# Patient Record
Sex: FEMALE | Race: OTHER | Employment: FULL TIME | URBAN - METROPOLITAN AREA
[De-identification: names, ages, dates, MRNs, and addresses within clinical notes are randomized per-mention and may not be internally consistent; named-entity substitution may affect disease eponyms.]

---

## 2017-04-10 PROBLEM — R00.2 INTERMITTENT PALPITATIONS: Status: ACTIVE | Noted: 2017-04-10

## 2017-09-29 ENCOUNTER — HOSPITAL ENCOUNTER (OUTPATIENT)
Dept: PHYSICAL THERAPY | Age: 27
Discharge: HOME | End: 2017-09-29
Payer: COMMERCIAL

## 2017-09-29 PROCEDURE — 97161 PT EVAL LOW COMPLEX 20 MIN: CPT

## 2017-09-29 NOTE — THERAPY EVALUATION
Abhishek Palacio  : 1990  Payor: Laly Martinez / Plan: [address] / Product Type: PPO /  [address]  at [facility]  [address] , [address], [place] [address]  [phone]   [phone]         OUTPATIENT PHYSICAL THERAPY:Initial Assessment 2017    ICD-10: Treatment Diagnosis: PATELLOFEMORAL DISORDERS M22.2X1;M22.2X2  Precautions/Allergies: non reported  Fall Risk Score: 0 (? 5 = High Risk)  MD Orders: evaluate and treat MEDICAL/REFERRING DIAGNOSIS:Bilateral Knee Pain  DATE OF ONSET: Oct 2016  [facility] PHYSICIAN:Lisha Pena MD  RETURN PHYSICIAN APPOINTMENT: did not specify     INITIAL ASSESSMENT:  Ms. Daquan Padilla presents to physical therapy with symptoms of bilateral knee pain . The examination reveals moderate medial joint line tenderness with (+) mcmurrey sign on the left side . The examination is of low complexity due to a stable clinical presentation. Skilled physical therapy is recommended to progress the plan of care in order for the patient to return to full function with minimal symptoms. PROBLEM LIST (Impacting functional limitations):  1. Decreased Strength  2. Decreased ADL/Functional Activities  3. Increased Pain  4. Decreased Activity Tolerance  5. Decreased Flexibility/Joint Mobility  6. Decreased [place] with Home Exercise Program INTERVENTIONS PLANNED:  1. Cold  2. Heat  3. Home Exercise Program (HEP)  4. Manual Therapy  5. Range of Motion (ROM)  6. Therapeutic Exercise/Strengthening     TREATMENT PLAN:  Effective Dates: 17 TO 17. Frequency/Duration: 1 time a week for 4 weeks  GOALS: (Goals have been discussed and agreed upon with patient.)  SHORT-TERM FUNCTIONAL GOALS: Time Frame: 2-4 wks  1. Patient will report 25-50% reduction in overall symptoms  2. Patient will be compliant with HEP and plan of care  3.   Patient will purchase the correct type of footwear as judged by therapist  DISCHARGE GOALS: Time Frame: 6-8 wk  1.  Patient will report % reduction in overall symptoms in order to be able to have full function with decreased symptoms  2. Patient will be able to run 3 miles with minimal symptoms (0-2/10)  3. Patient will improve on the LEFS by 4-5 points in order to demonstrate improved function with less pain    Rehabilitation Potential For Stated Goals: Good    Regarding [place] therapy, I certify that the treatment plan above will be carried out by a therapist or under their direction. Thank you for this referral,  Martín Valdez PT,DPT    Referring Physician Signature: Olga Pack MD _____________________________________________________ Date: __________________________________          HISTORY:   History of Present Injury/Illness (Reason for Referral):  Reports that she started having knee pain while running a long relay race (3,6, and 7 mile. She stopped running after that but continued to have pain. She played soccer but that bothered her knees as well. She signed up again for the relay race in October. Pain is in the medial knee. No previous trauma to the knees.   Aggravating factors: kneeling   Relieving factors:  KT tape  Past Medical History/Comorbidities: IBS  Social History/Living Environment: lives in an apartment (1 flight of steps to get to)  Prior Level of Function/Work/Activity: [employer]  Current Medications:  Intergam   Date Last Reviewed:  10/1/2017   Number of Personal Factors/Comorbidities that affect the Plan of Care: 0: LOW COMPLEXITY   EXAMINATION:   (Abbreviations: P-pain, NP- no pain, wnl-within normal limits)  Observation/Orthostatic Postural Assessment:    Relaxed standing posture:  · Calcaneal varus bilateral   · Neutral femur bilateral   · Tibial external rotation bilateral     Palpation/tone/tissue texture:    ASIS: symmetrical   PSIS: symmetrical   Leg Length: supine: symmetrical         Long Sitting: symmetrical       Soft tissue:  · Hip flexors: n/a   · Hamstrings: mild tightness noted bilateral   · IT band: n/a  · gastroc/soleus/posterior tibialis: mild tightness bilateral     Patella: good mobility bilateral       Foot Exam Date:  9-29-17       Left Right   Talocrural Joint: Mild hypomobility into dorsal glide Mild hypomobility    Rear foot: (neutral to relaxed: 4-6                      deg-normal) Hypermobile into inversion  Hypermobile into inversion (mild)   Mid-foot (navicular drop, <7-normal) N/a N/a   Forefoot: (position, mobility) Varus,mobile  Varus, mobile   First ray position/hallux limitus test: Mild rigidity  -   Windlass test: n/a -   Foot intrinsic strength:  good good         ROM:   Multisegmental ROM Date:  9-29-17       Flexion n/a   Extension n/a   Rotation L n/a   Rotation R n/a      Hip ROM Date:  9-29-17       Right Left   Flexion wnl wnl   Extension wnl wnl   IR wnl wnl   ER wnl wnl           Strength:     Hip Strength Date:  9-29-17       Right Left   Flexion 4+ 4+   Extension 5 4+   IR 5 5   ER 5 5   Abduction 5 4+      Knee Strength Date:  9-29-17       Right Left   Flexion 5 5   Extension 5 5             Special Tests:   Stability tests:  Lachmans: stable  Slocums:  AM: stable          AL:stable  Posterior drawer: stable  Posterior Sag:stable     Varus: 0 deg: (-)    30 deg: (-)  Valgus: 0 deg: (-)  30 deg: (-)    Meniscus cluster test  1. End range flexion: (-)  2. End range extension: (-)  3. Joint line tenderness: (+) bilateral for medial joint line tenderness   4. Report of locking: (-)  5.  McMurreys: (+) on L with valgus force    Neurological Screen:   Myotomes: strong in bilateral LE's     Dermatomes: intact in bilateral LE's         Reflexes: n/a         Neural Tension Tests: SLR (-)  Functional Mobility:    · Double leg squat: within normal limits  · Single leg squat:  L: within normal limits        R: within normal limits   · Gait Pattern: pronation did not occur until mid to terminal stance bilateral  Balance:  Single leg   L:  10 sec, mild sway, good stability   R: 10 sec, mild sway, good stability         Body Structures Involved:  1. Joints  2. Muscles  3. Ligaments Body Functions Affected:  1. Sensory/Pain  2. Neuromusculoskeletal  3. Movement Related Activities and Participation Affected:  1. Community, Social and [place]  2. running, exercise   Number of elements that affect the Plan of Care: 1-2: LOW COMPLEXITY   CLINICAL PRESENTATION:   Presentation: Stable and uncomplicated: LOW COMPLEXITY   CLINICAL DECISION MAKING:   Outcome Measure: Tool Used: Lower Extremity Functional Scale (LEFS)  Score:  Initial: 75/80 Most Recent: X/80 (Date: -- )   Interpretation of Score: 20 questions each scored on a 5 point scale with 0 representing \"extreme difficulty or unable to perform\" and 4 representing \"no difficulty\". The lower the score, the greater the functional disability. 80/80 represents no disability. Minimal detectable change is 9 points. Score 80 79-63 62-48 47-32 31-16 15-1 0   Modifier CH CI CJ CK CL CM CN     Medical Necessity:   · Patient is expected to demonstrate progress in strength, range of motion and symptom levels to return to full function. Reason for Services/Other Comments:  · Patient continues to require skilled intervention due to ongoing symptoms. Use of outcome tool(s) and clinical judgement create a POC that gives a: Clear prediction of patient's progress: LOW COMPLEXITY   TREATMENT:   (In addition to Assessment/Re-Assessment sessions the following treatments were rendered)    Subjective Pre-Treatment Symptoms: Reports that she has been unable to really run since last October     Therapeutic Exercise: (5 min) Done in order to improve strength, ROM and understanding of current condition.     Date:  9-29-17   Activity/Exercise Parameters   Education Discussed examination findings, HEP, plan of care  Discussed her footwear and orthotics in length       Manual Therapy: (-) Done in order to improve joint and soft tissue mobility,reduce muscle guarding, and decrease muscle tone    Modalities: (-) Done in order to reduce swelling and pain    Treatment/Session Assessment:  Patient tolerated the session well. Her plan of care was discussed. · Pain: Initial:    0/10 at rest, 6-7/10 with running Post Session:  0/10 at rest ·   Compliance with Program/Exercises: Will assess as treatment progresses. · Recommendations/Intent for next treatment session: \"Next visit will focus on progressing the patients plan of care\".   Future Appointments  Date Time Provider Xena Robert   10/9/2017 10:15 AM [facility]     Total Treatment Duration: evaluation only  PT Patient Time In/Time Out  Time In: 1445  Time Out: [address] PT, DPT

## 2017-10-02 NOTE — THERAPY EVALUATION
Ambulatory/Rehab Services H2 Model Falls Risk Assessment    Risk Factor Pts. ·   Confusion/Disorientation/Impulsivity  []    4 ·   Symptomatic Depression  []   2 ·   Altered Elimination  []   1 ·   Dizziness/Vertigo  []   1 ·   Gender (Male)  []   1 ·   Any administered antiepileptics (anticonvulsants):  []   2 ·   Any administered benzodiazepines:  []   1 ·   Visual Impairment (specify):  []   1 ·   Portable Oxygen Use  []   1 ·   Orthostatic ? BP  []   1 ·   History of Recent Falls (within 3 mos.)  []   5     Ability to Rise from Chair (choose one) Pts. ·   Ability to rise in a single movement  [x]   0 ·   Pushes up, successful in one attempt  []   1 ·   Multiple attempts, but successful  []   3 ·   Unable to rise without assistance  []   4   Total: (5 or greater = High Risk) 0     Falls Prevention Plan:   []                Physical Limitations to Exercise (specify):   []                Mobility Assistance Device (type):   []                Exercise/Equipment Adaptation (specify):    ©2010 [facility] Patent #[id number].  Federal Law prohibits the replication, distribution or use without written permission from [facility] [employer]

## 2017-10-09 ENCOUNTER — HOSPITAL ENCOUNTER (OUTPATIENT)
Dept: PHYSICAL THERAPY | Age: 27
Discharge: HOME | End: 2017-10-09
Payer: COMMERCIAL

## 2017-10-09 PROCEDURE — 97110 THERAPEUTIC EXERCISES: CPT

## 2017-10-09 NOTE — THERAPY EVALUATION
Sherrine Snellen  : 1990  Payor: 550Noble Martinez / Plan: [address] / Product Type: PPO /  [address]  at [facility]  [address], [address], [place] [address]  [phone]   [phone]         OUTPATIENT PHYSICAL THERAPY:Daily Note 10/9/2017    ICD-10: Treatment Diagnosis: PATELLOFEMORAL DISORDERS M22.2X1;M22.2X2  Precautions/Allergies: non reported  Fall Risk Score: 0 (? 5 = High Risk)  MD Orders: evaluate and treat MEDICAL/REFERRING DIAGNOSIS:Bilateral Knee Pain  DATE OF ONSET: Oct 2016  [place] PHYSICIAN:Lisha Pena MD  RETURN PHYSICIAN APPOINTMENT: did not specify     INITIAL ASSESSMENT:  Ms. Melanie Myers presents to physical therapy with symptoms of bilateral knee pain . The examination reveals moderate medial joint line tenderness with (+) mcmurrey sign on the left side . The examination is of low complexity due to a stable clinical presentation. Skilled physical therapy is recommended to progress the plan of care in order for the patient to return to full function with minimal symptoms. PROBLEM LIST (Impacting functional limitations):  1. Decreased Strength  2. Decreased ADL/Functional Activities  3. Increased Pain  4. Decreased Activity Tolerance  5. Decreased Flexibility/Joint Mobility  6. Decreased [place] with Home Exercise Program INTERVENTIONS PLANNED:  1. Cold  2. Heat  3. Home Exercise Program (HEP)  4. Manual Therapy  5. Range of Motion (ROM)  6. Therapeutic Exercise/Strengthening     TREATMENT PLAN:  Effective Dates: 17 TO 17. Frequency/Duration: 1 time a week for 4 weeks  GOALS: (Goals have been discussed and agreed upon with patient.)  SHORT-TERM FUNCTIONAL GOALS: Time Frame: 2-4 wks  1. Patient will report 25-50% reduction in overall symptoms  2. Patient will be compliant with HEP and plan of care  3. Patient will purchase the correct type of footwear as judged by therapist  DISCHARGE GOALS: Time Frame: 6-8 wk  1. Patient will report % reduction in overall symptoms in order to be able to have full function with decreased symptoms  2. Patient will be able to run 3 miles with minimal symptoms (0-2/10)  3. Patient will improve on the LEFS by 4-5 points in order to demonstrate improved function with less pain    Rehabilitation Potential For Stated Goals: Good    Regarding Nirav Mejia therapy, I certify that the treatment plan above will be carried out by a therapist or under their direction. Thank you for this referral,  Galilea Levy PT,DPT    Referring Physician Signature: Nancy Akbar MD _____________________________________________________ Date: __________________________________          HISTORY:   History of Present Injury/Illness (Reason for Referral):  Reports that she started having knee pain while running a long relay race (3,6, and 7 mile. She stopped running after that but continued to have pain. She played soccer but that bothered her knees as well. She signed up again for the relay race in October. Pain is in the medial knee. No previous trauma to the knees.   Aggravating factors: kneeling   Relieving factors:  KT tape  Past Medical History/Comorbidities: IBS  Social History/Living Environment: lives in an apartment (1 flight of steps to get to)  Prior Level of Function/Work/Activity: [employer]  Current Medications:  Intergam   Date Last Reviewed:  10/11/2017   Number of Personal Factors/Comorbidities that affect the Plan of Care: 0: LOW COMPLEXITY   EXAMINATION:   (Abbreviations: P-pain, NP- no pain, wnl-within normal limits)  Observation/Orthostatic Postural Assessment:    Relaxed standing posture:  · Calcaneal varus bilateral   · Neutral femur bilateral   · Tibial external rotation bilateral     Running analysis: [employer] software was used to capture sagittal distal, frontal posterior close up and frontal posterior distal video's (10-9-17)  · Sagittal: R: HS, 6 deg  Knee flex, .12 from COM, 11 deg hip exten               L: HS, 12 deg, . 14 from COM, 14 deg hip exten  · Frontal posterior distal (normal pelvic drop: 5-7 deg): R:10 deg                       L:8 deg  · Frontal posterior close up  (normal:w/ shoes 10 deg, without 12-15 deg): R:6 deg      L: 5 deg      Palpation/tone/tissue texture:    ASIS: symmetrical   PSIS: symmetrical   Leg Length: supine: symmetrical         Long Sitting: symmetrical       Soft tissue:  · Hip flexors: n/a   · Hamstrings: mild tightness noted bilateral   · IT band: n/a  · gastroc/soleus/posterior tibialis: mild tightness bilateral     Patella: good mobility bilateral       Foot Exam Date:  9-29-17       Left Right   Talocrural Joint: Mild hypomobility into dorsal glide Mild hypomobility    Rear foot: (neutral to relaxed: 4-6                      deg-normal) Hypermobile into inversion  Hypermobile into inversion (mild)   Mid-foot (navicular drop, <7-normal) N/a N/a   Forefoot: (position, mobility) Varus,mobile  Varus, mobile   First ray position/hallux limitus test: Mild rigidity  -   Windlass test: n/a -   Foot intrinsic strength:  good good         ROM:   Multisegmental ROM Date:  9-29-17       Flexion n/a   Extension n/a   Rotation L n/a   Rotation R n/a      Hip ROM Date:  9-29-17       Right Left   Flexion wnl wnl   Extension wnl wnl   IR wnl wnl   ER wnl wnl           Strength:     Hip Strength Date:  9-29-17       Right Left   Flexion 4+ 4+   Extension 5 4+   IR 5 5   ER 5 5   Abduction 5 4+      Knee Strength Date:  9-29-17       Right Left   Flexion 5 5   Extension 5 5             Special Tests:   Stability tests:  Lachmans: stable  Slocums:  AM: stable          AL:stable  Posterior drawer: stable  Posterior Sag:stable     Varus: 0 deg: (-)    30 deg: (-)  Valgus: 0 deg: (-)  30 deg: (-)    Meniscus cluster test  3. End range flexion: (-)  4. End range extension: (-)  5. Joint line tenderness: (+) bilateral for medial joint line tenderness   6.  Report of locking: (-)  7. McMurreys: (+) on L with valgus force    Neurological Screen:   Myotomes: strong in bilateral LE's     Dermatomes: intact in bilateral LE's         Reflexes: n/a         Neural Tension Tests: SLR (-)  Functional Mobility:    · Double leg squat: within normal limits  · Single leg squat:  L: within normal limits        R: within normal limits   · Gait Pattern: pronation did not occur until mid to terminal stance bilateral  Balance:  Single leg   L:  10 sec, mild sway, good stability   R: 10 sec, mild sway, good stability         CLINICAL DECISION MAKING:   Outcome Measure: Tool Used: Lower Extremity Functional Scale (LEFS)  Score:  Initial: 75/80 Most Recent: X/80 (Date: -- )   Interpretation of Score: 20 questions each scored on a 5 point scale with 0 representing \"extreme difficulty or unable to perform\" and 4 representing \"no difficulty\". The lower the score, the greater the functional disability. 80/80 represents no disability. Minimal detectable change is 9 points. Score 80 79-63 62-48 47-32 31-16 15-1 0   Modifier CH CI CJ CK CL CM CN     Medical Necessity:   · Patient is expected to demonstrate progress in strength, range of motion and symptom levels to return to full function. Reason for Services/Other Comments:  · Patient continues to require skilled intervention due to ongoing symptoms. TREATMENT:   (In addition to Assessment/Re-Assessment sessions the following treatments were rendered)    Subjective Pre-Treatment Symptoms: Reports that she is feeling about the same. Purchased a [employer] Free shoe    Therapeutic Exercise: (45 min) Done in order to improve strength, ROM and understanding of current condition.     Date:  9-29-17 Date  10-9-17   Activity/Exercise Parameters    Education Discussed examination findings, HEP, plan of care  Discussed her footwear and orthotics in length Discussed HEP, plan of care, and her footwear   Running analysis and feedback  Utilized [employer] software to capture running mechanics in sagittal and frontal planes. Discussed her form afterwards and gave her suggestions on how to reduce the ground reaction forces   Manual Therapy: (-) Done in order to improve joint and soft tissue mobility,reduce muscle guarding, and decrease muscle tone    Modalities: (-) Done in order to reduce swelling and pain    Treatment/Session Assessment:  Ms. Dodie Moore had some discomfort after the running analysis in her medial knee. Her running mechanics were relatively within normal limits. Discussed to follow up with a physician after her next race for either anti-inflammatory meds, injection or further imaging. · Pain: Initial:    Soreness reported at rest Post Session:  3/10 at rest ·   Compliance with Program/Exercises: Will assess as treatment progresses. · Recommendations/Intent for next treatment session: \"Next visit will focus on progressing the patients plan of care\". No future appointments.   Total Treatment Duration: 45 min  PT Patient Time In/Time Out  Time In: 1015  Time Out: 106 Margie Bueno PT, DPT

## 2018-03-02 NOTE — THERAPY EVALUATION
Pablo Abreu  : 1990  Payor: [id number] [facility] / Plan: [address] / Product Type: PPO /  [address]  at [facility]  [address], [address], [place]  [phone]   [phone]         OUTPATIENT PHYSICAL THERAPY:Discontinuation Summary 10/9/2017    ICD-10: Treatment Diagnosis: PATELLOFEMORAL DISORDERS M22.2X1;M22.2X2  Precautions/Allergies: non reported  Fall Risk Score: 0 (? 5 = High Risk)  MD Orders: evaluate and treat MEDICAL/REFERRING DIAGNOSIS:Bilateral Knee Pain  DATE OF ONSET: Oct 2016  [facility] PHYSICIAN:Lisha Pena MD  RETURN PHYSICIAN APPOINTMENT: did not specify      ASSESSMENT:  Pablo Abreu has been seen in physical therapy from 18 to 10-9-18 for 2 visits. Treatment has been discontinued at this time due to patient recommended to return back to her physician for further evaluation and possible imaging of her knee.   .   Thank you for this referral.       José Antonio Wise PT,DPT,OCS

## 2021-02-09 ENCOUNTER — APPOINTMENT (OUTPATIENT)
Dept: ULTRASOUND IMAGING | Age: 31
End: 2021-02-09
Payer: COMMERCIAL

## 2021-02-09 ENCOUNTER — APPOINTMENT (OUTPATIENT)
Dept: CT IMAGING | Age: 31
End: 2021-02-09
Attending: STUDENT IN AN ORGANIZED HEALTH CARE EDUCATION/TRAINING PROGRAM
Payer: COMMERCIAL

## 2021-02-09 ENCOUNTER — HOSPITAL ENCOUNTER (EMERGENCY)
Age: 31
Discharge: HOME | End: 2021-02-09
Payer: COMMERCIAL

## 2021-02-09 VITALS
DIASTOLIC BLOOD PRESSURE: 76 MMHG | OXYGEN SATURATION: 98 % | HEART RATE: 66 BPM | RESPIRATION RATE: 18 BRPM | TEMPERATURE: 97.9 F | HEIGHT: 63 IN | SYSTOLIC BLOOD PRESSURE: 123 MMHG | BODY MASS INDEX: 29.23 KG/M2 | WEIGHT: 165 LBS

## 2021-02-09 DIAGNOSIS — K63.89 EPIPLOIC APPENDAGITIS: Primary | ICD-10-CM

## 2021-02-09 LAB
ALBUMIN SERPL-MCNC: 3.9 G/DL (ref 3.5–5)
ALBUMIN/GLOB SERPL: 1 {RATIO} (ref 1.2–3.5)
ALP SERPL-CCNC: 73 U/L (ref 50–136)
ALT SERPL-CCNC: 31 U/L (ref 12–65)
ANION GAP SERPL CALC-SCNC: 3 MMOL/L (ref 7–16)
AST SERPL-CCNC: 17 U/L (ref 15–37)
BASOPHILS # BLD: 0.1 K/UL (ref 0–0.2)
BASOPHILS NFR BLD: 1 % (ref 0–2)
BILIRUB SERPL-MCNC: 0.3 MG/DL (ref 0.2–1.1)
BUN SERPL-MCNC: 9 MG/DL (ref 6–23)
CALCIUM SERPL-MCNC: 9.2 MG/DL (ref 8.3–10.4)
CHLORIDE SERPL-SCNC: 105 MMOL/L (ref 98–107)
CO2 SERPL-SCNC: 29 MMOL/L (ref 21–32)
CREAT SERPL-MCNC: 0.81 MG/DL (ref 0.6–1)
DIFFERENTIAL METHOD BLD: ABNORMAL
EOSINOPHIL # BLD: 0.1 K/UL (ref 0–0.8)
EOSINOPHIL NFR BLD: 1 % (ref 0.5–7.8)
ERYTHROCYTE [DISTWIDTH] IN BLOOD BY AUTOMATED COUNT: 11.7 % (ref 11.9–14.6)
GLOBULIN SER CALC-MCNC: 4.1 G/DL (ref 2.3–3.5)
GLUCOSE SERPL-MCNC: 81 MG/DL (ref 65–100)
HCG UR QL: NEGATIVE
HCT VFR BLD AUTO: 42.1 % (ref 35.8–46.3)
HGB BLD-MCNC: 14.3 G/DL (ref 11.7–15.4)
IMM GRANULOCYTES # BLD AUTO: 0 K/UL (ref 0–0.5)
IMM GRANULOCYTES NFR BLD AUTO: 0 % (ref 0–5)
LIPASE SERPL-CCNC: 197 U/L (ref 73–393)
LYMPHOCYTES # BLD: 2.8 K/UL (ref 0.5–4.6)
LYMPHOCYTES NFR BLD: 36 % (ref 13–44)
MCH RBC QN AUTO: 30.3 PG (ref 26.1–32.9)
MCHC RBC AUTO-ENTMCNC: 34 G/DL (ref 31.4–35)
MCV RBC AUTO: 89.2 FL (ref 79.6–97.8)
MONOCYTES # BLD: 0.9 K/UL (ref 0.1–1.3)
MONOCYTES NFR BLD: 12 % (ref 4–12)
NEUTS SEG # BLD: 4 K/UL (ref 1.7–8.2)
NEUTS SEG NFR BLD: 50 % (ref 43–78)
NRBC # BLD: 0 K/UL (ref 0–0.2)
PLATELET # BLD AUTO: 256 K/UL (ref 150–450)
PMV BLD AUTO: 9.4 FL (ref 9.4–12.3)
POTASSIUM SERPL-SCNC: 3.6 MMOL/L (ref 3.5–5.1)
PROT SERPL-MCNC: 8 G/DL (ref 6.3–8.2)
RBC # BLD AUTO: 4.72 M/UL (ref 4.05–5.2)
SODIUM SERPL-SCNC: 137 MMOL/L (ref 136–145)
WBC # BLD AUTO: 7.9 K/UL (ref 4.3–11.1)

## 2021-02-09 PROCEDURE — 74011250636 HC RX REV CODE- 250/636: Performed by: PHYSICIAN ASSISTANT

## 2021-02-09 PROCEDURE — 76705 ECHO EXAM OF ABDOMEN: CPT

## 2021-02-09 PROCEDURE — 80053 COMPREHEN METABOLIC PANEL: CPT

## 2021-02-09 PROCEDURE — 96360 HYDRATION IV INFUSION INIT: CPT

## 2021-02-09 PROCEDURE — 74177 CT ABD & PELVIS W/CONTRAST: CPT

## 2021-02-09 PROCEDURE — 96361 HYDRATE IV INFUSION ADD-ON: CPT

## 2021-02-09 PROCEDURE — 99283 EMERGENCY DEPT VISIT LOW MDM: CPT

## 2021-02-09 PROCEDURE — 74011000636 HC RX REV CODE- 636: Performed by: STUDENT IN AN ORGANIZED HEALTH CARE EDUCATION/TRAINING PROGRAM

## 2021-02-09 PROCEDURE — 81003 URINALYSIS AUTO W/O SCOPE: CPT

## 2021-02-09 PROCEDURE — 85025 COMPLETE CBC W/AUTO DIFF WBC: CPT

## 2021-02-09 PROCEDURE — 81025 URINE PREGNANCY TEST: CPT

## 2021-02-09 PROCEDURE — 83690 ASSAY OF LIPASE: CPT

## 2021-02-09 PROCEDURE — 74011000258 HC RX REV CODE- 258: Performed by: STUDENT IN AN ORGANIZED HEALTH CARE EDUCATION/TRAINING PROGRAM

## 2021-02-09 RX ORDER — SODIUM CHLORIDE 0.9 % (FLUSH) 0.9 %
10 SYRINGE (ML) INJECTION
Status: COMPLETED | OUTPATIENT
Start: 2021-02-09 | End: 2021-02-09

## 2021-02-09 RX ORDER — BUPROPION HYDROCHLORIDE 150 MG/1
150 TABLET, EXTENDED RELEASE ORAL DAILY
COMMUNITY

## 2021-02-09 RX ORDER — IBUPROFEN 800 MG/1
800 TABLET ORAL
Qty: 20 TAB | Refills: 0 | Status: SHIPPED | OUTPATIENT
Start: 2021-02-09 | End: 2021-02-16

## 2021-02-09 RX ADMIN — SODIUM CHLORIDE 1000 ML: 900 INJECTION, SOLUTION INTRAVENOUS at 18:26

## 2021-02-09 RX ADMIN — SODIUM CHLORIDE 100 ML: 900 INJECTION, SOLUTION INTRAVENOUS at 20:38

## 2021-02-09 RX ADMIN — IOPAMIDOL 100 ML: 755 INJECTION, SOLUTION INTRAVENOUS at 20:38

## 2021-02-09 RX ADMIN — Medication 10 ML: at 20:39

## 2021-02-09 NOTE — ED PROVIDER NOTES
Patient is a 68-year-old female who comes in with right upper quadrant abdominal pain. Symptoms started about 2 weeks ago. Patient reports that she went to the urgent care today and had some labs done as well as a urine test and they were basically within normal limits but she was told to come to the emergency department for further testing due to location of her pain. She does report a history of biliary disease. She denies fevers or chills. She says that the pain is basically constant and she has noted that it seems to worsen with certain movements. She denies injury or straining the muscles that she can recall. She denies black or bloody stools or diarrhea. She denies pelvic pain. The history is provided by the patient. Abdominal Pain   This is a new problem. The current episode started more than 1 week ago. The problem occurs constantly. The problem has not changed since onset. The pain is located in the RUQ. The pain is at a severity of 5/10. The pain is mild. Pertinent negatives include no anorexia, no fever, no belching, no diarrhea, no flatus, no hematochezia, no melena, no nausea, no vomiting, no constipation, no dysuria, no frequency, no hematuria, no headaches, no arthralgias, no myalgias, no trauma, no chest pain and no back pain. The pain is worsened by certain positions. The pain is relieved by nothing. Her past medical history does not include PUD, gallstones, GERD, ulcerative colitis, Crohn's disease, irritable bowel syndrome, cancer, UTI, pancreatitis, ectopic pregnancy, ovarian cysts, diverticulitis, atrial fibrillation, DM, kidney stones or small bowel obstruction.         Past Medical History:   Diagnosis Date    IBS (irritable bowel syndrome)        Past Surgical History:   Procedure Laterality Date    HX WISDOM TEETH EXTRACTION  2006         Family History:   Problem Relation Age of Onset    No Known Problems Mother     No Known Problems Father     Coronary Artery Disease Neg Hx Social History     Socioeconomic History    Marital status: SINGLE     Spouse name: Not on file    Number of children: Not on file    Years of education: Not on file    Highest education level: Not on file   Occupational History    Not on file   Social Needs    Financial resource strain: Not on file    Food insecurity     Worry: Not on file     Inability: Not on file    Transportation needs     Medical: Not on file     Non-medical: Not on file   Tobacco Use    Smoking status: Never Smoker    Smokeless tobacco: Never Used   Substance and Sexual Activity    Alcohol use: Not Currently     Frequency: Never     Comment: attending AA    Drug use: No    Sexual activity: Yes     Partners: Male     Birth control/protection: Pill   Lifestyle    Physical activity     Days per week: Not on file     Minutes per session: Not on file    Stress: Not on file   Relationships    Social connections     Talks on phone: Not on file     Gets together: Not on file     Attends Jew service: Not on file     Active member of club or organization: Not on file     Attends meetings of clubs or organizations: Not on file     Relationship status: Not on file    Intimate partner violence     Fear of current or ex partner: Not on file     Emotionally abused: Not on file     Physically abused: Not on file     Forced sexual activity: Not on file   Other Topics Concern     Service Not Asked    Blood Transfusions Not Asked    Caffeine Concern No     Comment: Once a day 12 oz    Occupational Exposure Not Asked   Kristin [place] Hazards Not Asked    Sleep Concern Not Asked    Stress Concern Not Asked    Weight Concern Not Asked    Special Diet Not Asked    Back Care Not Asked    Exercise Yes     Comment: 3 times a week    Bike Helmet Not Asked    Seat Belt Yes    Self-Exams Not Asked   Social History Narrative    Denies physical or sexual abuse         ALLERGIES: Patient has no known allergies.     Review of Systems   Constitutional: Negative for chills and fever. Respiratory: Negative for cough and shortness of breath. Cardiovascular: Negative for chest pain. Gastrointestinal: Positive for abdominal pain. Negative for abdominal distention, anorexia, blood in stool, constipation, diarrhea, flatus, hematochezia, melena, nausea, rectal pain and vomiting. Genitourinary: Negative for dysuria, frequency and hematuria. Musculoskeletal: Negative for arthralgias, back pain and myalgias. Skin: Negative for color change. Neurological: Negative for headaches. All other systems reviewed and are negative. Vitals:    02/09/21 1724   BP: (!) 140/95   Pulse: 76   Resp: 18   Temp: 97.9 °F (36.6 °C)   SpO2: 96%   Weight: 74.8 kg (165 lb)   Height: 5' 3\" (1.6 m)            Physical Exam  Vitals signs and nursing note reviewed. Constitutional:       General: She is not in acute distress. Appearance: Normal appearance. She is not ill-appearing, toxic-appearing or diaphoretic. HENT:      Head: Normocephalic and atraumatic. Eyes:      Conjunctiva/sclera: Conjunctivae normal.   Cardiovascular:      Rate and Rhythm: Normal rate and regular rhythm. Pulses: Normal pulses. Heart sounds: Normal heart sounds. Pulmonary:      Effort: Pulmonary effort is normal. No respiratory distress. Breath sounds: Normal breath sounds and air entry. No stridor, decreased air movement or transmitted upper airway sounds. No decreased breath sounds, wheezing, rhonchi or rales. Chest:      Chest wall: No tenderness. Abdominal:      General: Abdomen is flat. Bowel sounds are normal. There is no distension. Palpations: Abdomen is soft. There is no mass. Tenderness: There is abdominal tenderness. There is no right CVA tenderness, left CVA tenderness, guarding or rebound. Negative signs include McBurney's sign.       Comments: Right upper quadrant tenderness on examination   Skin:     General: Skin is warm and dry.   Neurological:      General: No focal deficit present. Mental Status: She is alert and oriented to person, place, and time. Mental status is at baseline. MDM  Number of Diagnoses or Management Options  Epiploic appendagitis: new and requires workup  Diagnosis management comments: Patient comes in with right upper quadrant abdominal pain. She says that symptoms began about 2 weeks ago and she does not recall an injury. She says that pain is worse with direct palpation as well as with certain movements. She denies nausea vomiting diarrhea fevers or bloody stools. She says the pain is mild to moderate and she refuses to take any pain medications at this time because she does not feel like it is that bad. She went to the urgent care today and was told to come in for further evaluation. Labs, urine and fragment were obtained. Patient given IV fluids. Refused pain medications. Right upper quadrant ultrasound ordered. Amount and/or Complexity of Data Reviewed  Clinical lab tests: reviewed and ordered  Tests in the radiology section of CPT®: ordered and reviewed  Tests in the medicine section of CPT®: ordered and reviewed    Risk of Complications, Morbidity, and/or Mortality  Presenting problems: moderate  Diagnostic procedures: low  Management options: low    Patient Progress  Patient progress: stable    ED Course as of Feb 11 1604   Tue Feb 09, 2021   230 27-year-old female patient seen by PA on arrival.  Awaiting ultrasound imaging of the right upper quadrant. Patient standing with focal right upper quadrant pain for several weeks. Seen at urgent care earlier today where she had labs and urinalysis performed. Labs here are unremarkable. Patient denies need for pain medicine. Outgoing provider asked that I follow-up ultrasound imaging and dispo of patient. [BR]   2044 Patient reevaluated after ultrasound results were reviewed.   She has reproducible pain with palpation of the right abdomen, slightly higher than the right lower quadrant though Brian Meres sign is felt to be given this patient. Discussed option for CT imaging, patient wishes to pursue this test.  Orders placed for this imaging study.     [BR]      ED Course User Index  [BR] Alistair Bustamante,        Procedures

## 2021-02-09 NOTE — Clinical Note
[facility] EMERGENCY DEPT 
[id number] [facility] [id number] 
[phone] Work/School Note Date: 2/9/2021 To Whom It May concern: 
 
Kathrine Agee was seen and treated today in the emergency room by the following provider(s): 
Attending Provider: Toño Torres MD 
Physician Assistant: Liz Reid. Kathrine Agee is excused from work/school on 02/09/21 and 02/10/21. She is medically clear to return to work/school on 2/11/2021. Sincerely, Liz Rodriguez

## 2021-02-09 NOTE — ED TRIAGE NOTES
Pt here with c/o right sided pelvic pain that is constant and dull. This has been going on for two weeks. Went to urgent care and they said her urine hcg was negative and told her to come to ER to get scans. Blood work at urgent care looked good per pt and she has copies. Hx of ovarian cysts and rupture. States it does not feel like that. Masked.

## 2021-02-09 NOTE — DISCHARGE INSTRUCTIONS
Arrange follow-up with your primary care provider. Take the medication provided as directed for pain. Return for worsening symptoms, concerns or questions.

## 2021-02-10 NOTE — ED NOTES
I have reviewed discharge instructions with the patient. The patient verbalized understanding. Patient left ED via Discharge Method: ambulatory to Home with family. Opportunity for questions and clarification provided. Patient given 1 scripts. To continue your aftercare when you leave the hospital, you may receive an automated call from our care team to check in on how you are doing. This is a free service and part of our promise to provide the best care and service to meet your aftercare needs.  If you have questions, or wish to unsubscribe from this service please call [phone]. Thank you for Choosing our [facility] Emergency Department.

## 2025-02-03 ENCOUNTER — APPOINTMENT (OUTPATIENT)
Dept: URBAN - METROPOLITAN AREA CLINIC 23 | Facility: CLINIC | Age: 35
Setting detail: DERMATOLOGY
End: 2025-02-03

## 2025-02-03 DIAGNOSIS — L73.9 FOLLICULAR DISORDER, UNSPECIFIED: ICD-10-CM | Status: INADEQUATELY CONTROLLED

## 2025-02-03 DIAGNOSIS — L60.8 OTHER NAIL DISORDERS: ICD-10-CM

## 2025-02-03 DIAGNOSIS — L68.0 HIRSUTISM: ICD-10-CM | Status: INADEQUATELY CONTROLLED

## 2025-02-03 DIAGNOSIS — L738 OTHER SPECIFIED DISEASES OF HAIR AND HAIR FOLLICLES: ICD-10-CM | Status: INADEQUATELY CONTROLLED

## 2025-02-03 DIAGNOSIS — L663 OTHER SPECIFIED DISEASES OF HAIR AND HAIR FOLLICLES: ICD-10-CM | Status: INADEQUATELY CONTROLLED

## 2025-02-03 DIAGNOSIS — L70.0 ACNE VULGARIS: ICD-10-CM | Status: INADEQUATELY CONTROLLED

## 2025-02-03 DIAGNOSIS — D485 NEOPLASM OF UNCERTAIN BEHAVIOR OF SKIN: ICD-10-CM

## 2025-02-03 DIAGNOSIS — D22 MELANOCYTIC NEVI: ICD-10-CM

## 2025-02-03 PROBLEM — L02.426 FURUNCLE OF LEFT LOWER LIMB: Status: ACTIVE | Noted: 2025-02-03

## 2025-02-03 PROBLEM — D22.22 MELANOCYTIC NEVI OF LEFT EAR AND EXTERNAL AURICULAR CANAL: Status: ACTIVE | Noted: 2025-02-03

## 2025-02-03 PROBLEM — D48.5 NEOPLASM OF UNCERTAIN BEHAVIOR OF SKIN: Status: ACTIVE | Noted: 2025-02-03

## 2025-02-03 PROBLEM — L02.425 FURUNCLE OF RIGHT LOWER LIMB: Status: ACTIVE | Noted: 2025-02-03

## 2025-02-03 PROCEDURE — ? COUNSELING

## 2025-02-03 PROCEDURE — ? ADDITIONAL NOTES

## 2025-02-03 PROCEDURE — ? PHOTO-DOCUMENTATION

## 2025-02-03 PROCEDURE — ? DEFER

## 2025-02-03 PROCEDURE — ? REFERRAL

## 2025-02-03 PROCEDURE — 99204 OFFICE O/P NEW MOD 45 MIN: CPT

## 2025-02-03 PROCEDURE — ? PRESCRIPTION MEDICATION MANAGEMENT

## 2025-02-03 PROCEDURE — ? PRESCRIPTION

## 2025-02-03 RX ORDER — CLINDAMYCIN PHOSPHATE 10 MG/ML
SOLUTION TOPICAL
Qty: 60 | Refills: 5 | Status: ERX | COMMUNITY
Start: 2025-02-03

## 2025-02-03 RX ORDER — ADAPALENE AND BENZOYL PEROXIDE .1; 2.5 G/100G; G/100G
GEL TOPICAL
Qty: 45 | Refills: 3 | Status: ERX | COMMUNITY
Start: 2025-02-03

## 2025-02-03 RX ADMIN — CLINDAMYCIN PHOSPHATE: 10 SOLUTION TOPICAL at 00:00

## 2025-02-03 RX ADMIN — ADAPALENE AND BENZOYL PEROXIDE: .1; 2.5 GEL TOPICAL at 00:00

## 2025-02-03 ASSESSMENT — LOCATION SIMPLE DESCRIPTION DERM
LOCATION SIMPLE: RIGHT CHEEK
LOCATION SIMPLE: RIGHT 4TH TOE
LOCATION SIMPLE: ANTERIOR SCALP
LOCATION SIMPLE: LEFT THIGH
LOCATION SIMPLE: LEFT HAND
LOCATION SIMPLE: RIGHT THIGH
LOCATION SIMPLE: LEFT 5TH TOE
LOCATION SIMPLE: LEFT CHEEK
LOCATION SIMPLE: SUBMENTAL CHIN
LOCATION SIMPLE: LEFT EAR
LOCATION SIMPLE: LEFT 4TH TOE
LOCATION SIMPLE: SUPERIOR FOREHEAD

## 2025-02-03 ASSESSMENT — LOCATION ZONE DERM
LOCATION ZONE: LEG
LOCATION ZONE: TOENAIL
LOCATION ZONE: SCALP
LOCATION ZONE: HAND
LOCATION ZONE: TOE
LOCATION ZONE: EAR
LOCATION ZONE: FACE

## 2025-02-03 ASSESSMENT — LOCATION DETAILED DESCRIPTION DERM
LOCATION DETAILED: LEFT ANTERIOR PROXIMAL THIGH
LOCATION DETAILED: RIGHT LATERAL BUCCAL CHEEK
LOCATION DETAILED: MID-FRONTAL SCALP
LOCATION DETAILED: SUPERIOR MID FOREHEAD
LOCATION DETAILED: LEFT DORSAL INDEX FINGER METACARPOPHALANGEAL JOINT
LOCATION DETAILED: LEFT 4TH TOENAIL
LOCATION DETAILED: SUBMENTAL CHIN
LOCATION DETAILED: LEFT INFERIOR HELIX
LOCATION DETAILED: RIGHT 4TH TOENAIL
LOCATION DETAILED: LEFT 5TH TOENAIL
LOCATION DETAILED: LEFT CENTRAL BUCCAL CHEEK
LOCATION DETAILED: RIGHT ANTERIOR PROXIMAL THIGH
LOCATION DETAILED: LEFT MEDIAL 5TH TOE

## 2025-02-03 NOTE — PROCEDURE: COUNSELING
Detail Level: Detailed
Bactrim Pregnancy And Lactation Text: This medication is Pregnancy Category D and is known to cause fetal risk.  It is also excreted in breast milk.
Isotretinoin Counseling: Patient should get monthly blood tests, not donate blood, not drive at night if vision affected, not share medication, and not undergo elective surgery for 6 months after tx completed. Side effects reviewed, pt to contact office should one occur.
Minocycline Counseling: Patient advised regarding possible photosensitivity and discoloration of the teeth, skin, lips, tongue and gums.  Patient instructed to avoid sunlight, if possible.  When exposed to sunlight, patients should wear protective clothing, sunglasses, and sunscreen.  The patient was instructed to call the office immediately if the following severe adverse effects occur:  hearing changes, easy bruising/bleeding, severe headache, or vision changes.  The patient verbalized understanding of the proper use and possible adverse effects of minocycline.  All of the patient's questions and concerns were addressed.
Azelaic Acid Pregnancy And Lactation Text: This medication is considered safe during pregnancy and breast feeding.
Include Pregnancy/Lactation Warning?: No
Erythromycin Counseling:  I discussed with the patient the risks of erythromycin including but not limited to GI upset, allergic reaction, drug rash, diarrhea, increase in liver enzymes, and yeast infections.
Benzoyl Peroxide Pregnancy And Lactation Text: This medication is Pregnancy Category C. It is unknown if benzoyl peroxide is excreted in breast milk.
Winlevi Pregnancy And Lactation Text: This medication is considered safe during pregnancy and breastfeeding.
Doxycycline Counseling:  Patient counseled regarding possible photosensitivity and increased risk for sunburn.  Patient instructed to avoid sunlight, if possible.  When exposed to sunlight, patients should wear protective clothing, sunglasses, and sunscreen.  The patient was instructed to call the office immediately if the following severe adverse effects occur:  hearing changes, easy bruising/bleeding, severe headache, or vision changes.  The patient verbalized understanding of the proper use and possible adverse effects of doxycycline.  All of the patient's questions and concerns were addressed.
Azithromycin Pregnancy And Lactation Text: This medication is considered safe during pregnancy and is also secreted in breast milk.
Topical Retinoid Pregnancy And Lactation Text: This medication is Pregnancy Category C. It is unknown if this medication is excreted in breast milk.
Topical Sulfur Applications Pregnancy And Lactation Text: This medication is Pregnancy Category C and has an unknown safety profile during pregnancy. It is unknown if this topical medication is excreted in breast milk.
Spironolactone Pregnancy And Lactation Text: This medication can cause feminization of the male fetus and should be avoided during pregnancy. The active metabolite is also found in breast milk.
Tetracycline Counseling: Patient counseled regarding possible photosensitivity and increased risk for sunburn.  Patient instructed to avoid sunlight, if possible.  When exposed to sunlight, patients should wear protective clothing, sunglasses, and sunscreen.  The patient was instructed to call the office immediately if the following severe adverse effects occur:  hearing changes, easy bruising/bleeding, severe headache, or vision changes.  The patient verbalized understanding of the proper use and possible adverse effects of tetracycline.  All of the patient's questions and concerns were addressed. Patient understands to avoid pregnancy while on therapy due to potential birth defects.
Sarecycline Pregnancy And Lactation Text: This medication is Pregnancy Category D and not consider safe during pregnancy. It is also excreted in breast milk.
Dapsone Counseling: I discussed with the patient the risks of dapsone including but not limited to hemolytic anemia, agranulocytosis, rashes, methemoglobinemia, kidney failure, peripheral neuropathy, headaches, GI upset, and liver toxicity.  Patients who start dapsone require monitoring including baseline LFTs and weekly CBCs for the first month, then every month thereafter.  The patient verbalized understanding of the proper use and possible adverse effects of dapsone.  All of the patient's questions and concerns were addressed.
Tazorac Pregnancy And Lactation Text: This medication is not safe during pregnancy. It is unknown if this medication is excreted in breast milk.
Aklief counseling:  Patient advised to apply a pea-sized amount only at bedtime and wait 30 minutes after washing their face before applying.  If too drying, patient may add a non-comedogenic moisturizer.  The most commonly reported side effects including irritation, redness, scaling, dryness, stinging, burning, itching, and increased risk of sunburn.  The patient verbalized understanding of the proper use and possible adverse effects of retinoids.  All of the patient's questions and concerns were addressed.
High Dose Vitamin A Pregnancy And Lactation Text: High dose vitamin A therapy is contraindicated during pregnancy and breast feeding.
Birth Control Pills Counseling: Birth Control Pill Counseling: I discussed with the patient the potential side effects of OCPs including but not limited to increased risk of stroke, heart attack, thrombophlebitis, deep venous thrombosis, hepatic adenomas, breast changes, GI upset, headaches, and depression.  The patient verbalized understanding of the proper use and possible adverse effects of OCPs. All of the patient's questions and concerns were addressed.
Isotretinoin Pregnancy And Lactation Text: This medication is Pregnancy Category X and is considered extremely dangerous during pregnancy. It is unknown if it is excreted in breast milk.
Erythromycin Pregnancy And Lactation Text: This medication is Pregnancy Category B and is considered safe during pregnancy. It is also excreted in breast milk.
Topical Clindamycin Pregnancy And Lactation Text: This medication is Pregnancy Category B and is considered safe during pregnancy. It is unknown if it is excreted in breast milk.
Azelaic Acid Counseling: Patient counseled that medicine may cause skin irritation and to avoid applying near the eyes.  In the event of skin irritation, the patient was advised to reduce the amount of the drug applied or use it less frequently.   The patient verbalized understanding of the proper use and possible adverse effects of azelaic acid.  All of the patient's questions and concerns were addressed.
Bactrim Counseling:  I discussed with the patient the risks of sulfa antibiotics including but not limited to GI upset, allergic reaction, drug rash, diarrhea, dizziness, photosensitivity, and yeast infections.  Rarely, more serious reactions can occur including but not limited to aplastic anemia, agranulocytosis, methemoglobinemia, blood dyscrasias, liver or kidney failure, lung infiltrates or desquamative/blistering drug rashes.
Benzoyl Peroxide Counseling: Patient counseled that medicine may cause skin irritation and bleach clothing.  In the event of skin irritation, the patient was advised to reduce the amount of the drug applied or use it less frequently.   The patient verbalized understanding of the proper use and possible adverse effects of benzoyl peroxide.  All of the patient's questions and concerns were addressed.
Topical Retinoid counseling:  Patient advised to apply a pea-sized amount only at bedtime and wait 30 minutes after washing their face before applying.  If too drying, patient may add a non-comedogenic moisturizer. The patient verbalized understanding of the proper use and possible adverse effects of retinoids.  All of the patient's questions and concerns were addressed.
Doxycycline Pregnancy And Lactation Text: This medication is Pregnancy Category D and not consider safe during pregnancy. It is also excreted in breast milk but is considered safe for shorter treatment courses.
Dapsone Pregnancy And Lactation Text: This medication is Pregnancy Category C and is not considered safe during pregnancy or breast feeding.
Azithromycin Counseling:  I discussed with the patient the risks of azithromycin including but not limited to GI upset, allergic reaction, drug rash, diarrhea, and yeast infections.
Spironolactone Counseling: Patient advised regarding risks of diarrhea, abdominal pain, hyperkalemia, birth defects (for female patients), liver toxicity and renal toxicity. The patient may need blood work to monitor liver and kidney function and potassium levels while on therapy. The patient verbalized understanding of the proper use and possible adverse effects of spironolactone.  All of the patient's questions and concerns were addressed.
Winlevi Counseling:  I discussed with the patient the risks of topical clascoterone including but not limited to erythema, scaling, itching, and stinging. Patient voiced their understanding.
Birth Control Pills Pregnancy And Lactation Text: This medication should be avoided if pregnant and for the first 30 days post-partum.
Tazorac Counseling:  Patient advised that medication is irritating and drying.  Patient may need to apply sparingly and wash off after an hour before eventually leaving it on overnight.  The patient verbalized understanding of the proper use and possible adverse effects of tazorac.  All of the patient's questions and concerns were addressed.
Topical Sulfur Applications Counseling: Topical Sulfur Counseling: Patient counseled that this medication may cause skin irritation or allergic reactions.  In the event of skin irritation, the patient was advised to reduce the amount of the drug applied or use it less frequently.   The patient verbalized understanding of the proper use and possible adverse effects of topical sulfur application.  All of the patient's questions and concerns were addressed.
High Dose Vitamin A Counseling: Side effects reviewed, pt to contact office should one occur.
Topical Clindamycin Counseling: Patient counseled that this medication may cause skin irritation or allergic reactions.  In the event of skin irritation, the patient was advised to reduce the amount of the drug applied or use it less frequently.   The patient verbalized understanding of the proper use and possible adverse effects of clindamycin.  All of the patient's questions and concerns were addressed.
Aklief Pregnancy And Lactation Text: It is unknown if this medication is safe to use during pregnancy.  It is unknown if this medication is excreted in breast milk.  Breastfeeding women should use the topical cream on the smallest area of the skin for the shortest time needed while breastfeeding.  Do not apply to nipple and areola.
Sarecycline Counseling: Patient advised regarding possible photosensitivity and discoloration of the teeth, skin, lips, tongue and gums.  Patient instructed to avoid sunlight, if possible.  When exposed to sunlight, patients should wear protective clothing, sunglasses, and sunscreen.  The patient was instructed to call the office immediately if the following severe adverse effects occur:  hearing changes, easy bruising/bleeding, severe headache, or vision changes.  The patient verbalized understanding of the proper use and possible adverse effects of sarecycline.  All of the patient's questions and concerns were addressed.

## 2025-02-03 NOTE — PROCEDURE: DEFER
Procedure To Be Performed At Next Visit: Biopsy by shave method
X Size Of Lesion In Cm (Optional): 0
Detail Level: Detailed
Introduction Text (Please End With A Colon): The following procedure was deferred:
Procedure To Be Performed At Next Visit: Shave Removal

## 2025-02-03 NOTE — PROCEDURE: ADDITIONAL NOTES
Additional Notes: She was diagnosed with PCOS. Briefly discussed spironolactone. She tells me she is seeing her gynecologist soon and will see what they talk about. Also recommended LHR.
Render Risk Assessment In Note?: yes
Detail Level: Simple
Additional Notes: Patient claims it has grown; recommended we biopsy it.

## 2025-02-03 NOTE — PROCEDURE: PHOTO-DOCUMENTATION
Details (Free Text): Pt to observe for changes. This can happen to people with increased melanin in the body; she is [place]. She also is a runner and used to run alot, and we did talk about to trauma to the nail can do this too. Will refer to podiatry for a second opinion.
Detail Level: Simple
Details (Free Text): Claims it's been present for years, since her childhood.
Detail Level: Zone

## 2025-02-03 NOTE — PROCEDURE: PRESCRIPTION MEDICATION MANAGEMENT
Render In Strict Bullet Format?: No
Detail Level: Simple
Initiate Treatment: adapalene 0.1 %-benzoyl peroxide 2.5 % topical gel with pump Apply to AA QHS face
Initiate Treatment: clindamycin phosphate 1 % topical solution Apply BID folliculitis areas prn ( thighs)
Detail Level: Zone

## 2025-03-03 ENCOUNTER — APPOINTMENT (OUTPATIENT)
Dept: URBAN - METROPOLITAN AREA CLINIC 23 | Facility: CLINIC | Age: 35
Setting detail: DERMATOLOGY
End: 2025-03-03

## 2025-03-03 DIAGNOSIS — D485 NEOPLASM OF UNCERTAIN BEHAVIOR OF SKIN: ICD-10-CM

## 2025-03-03 PROBLEM — D48.5 NEOPLASM OF UNCERTAIN BEHAVIOR OF SKIN: Status: ACTIVE | Noted: 2025-03-03

## 2025-03-03 PROCEDURE — ? ADDITIONAL NOTES

## 2025-03-03 PROCEDURE — 11103 TANGNTL BX SKIN EA SEP/ADDL: CPT

## 2025-03-03 PROCEDURE — 11305 SHAVE SKIN LESION 0.5 CM/<: CPT

## 2025-03-03 PROCEDURE — 11102 TANGNTL BX SKIN SINGLE LES: CPT | Mod: 59

## 2025-03-03 PROCEDURE — ? SHAVE REMOVAL

## 2025-03-03 PROCEDURE — ? BIOPSY BY SHAVE METHOD

## 2025-03-03 PROCEDURE — ? COUNSELING

## 2025-03-03 ASSESSMENT — LOCATION SIMPLE DESCRIPTION DERM
LOCATION SIMPLE: LEFT HAND
LOCATION SIMPLE: SUPERIOR FOREHEAD
LOCATION SIMPLE: RIGHT FOREHEAD
LOCATION SIMPLE: ANTERIOR SCALP

## 2025-03-03 ASSESSMENT — LOCATION DETAILED DESCRIPTION DERM
LOCATION DETAILED: LEFT DORSAL INDEX FINGER METACARPOPHALANGEAL JOINT
LOCATION DETAILED: MID-FRONTAL SCALP
LOCATION DETAILED: SUPERIOR MID FOREHEAD
LOCATION DETAILED: RIGHT SUPERIOR MEDIAL FOREHEAD

## 2025-03-03 ASSESSMENT — LOCATION ZONE DERM
LOCATION ZONE: SCALP
LOCATION ZONE: HAND
LOCATION ZONE: FACE

## 2025-03-03 NOTE — PROCEDURE: ADDITIONAL NOTES
Render Risk Assessment In Note?: yes
Additional Notes: Patient claims it has grown; recommended we biopsy it.
Detail Level: Simple

## 2025-03-03 NOTE — PROCEDURE: SHAVE REMOVAL
Medical Necessity Information: It is in your best interest to select a reason for this procedure from the list below. All of these items fulfill various CMS LCD requirements except the new and changing color options.
Medical Necessity Clause: This procedure was medically necessary because the lesion that was treated was:enlarging
Lab: [id number]
Lab Facility: [id number]
Detail Level: Detailed
Was A Bandage Applied: Yes
Size Of Lesion In Cm (Required): 0.2
X Size Of Lesion In Cm (Optional): 0
Depth Of Shave: dermis
Biopsy Method: double edge Personna blade
Anesthesia Type: 1% lidocaine without epinephrine and a 1:12 solution of 8.4% sodium bicarbonate
Anesthesia Volume In Cc: 0.4
Hemostasis: Aluminum Chloride
Wound Care: Petrolatum
Render Path Notes In Note?: No
Consent was obtained from the patient. The risks and benefits to therapy were discussed in detail. Specifically, the risks of infection, scarring, bleeding, prolonged wound healing, incomplete removal, allergy to anesthesia, nerve injury and recurrence were addressed. Prior to the procedure, the treatment site was clearly identified and confirmed by the patient. All components of Universal Protocol/PAUSE Rule completed.
Post-Care Instructions: I reviewed with the patient in detail post-care instructions. Patient is to keep the biopsy site dry overnight, and then apply bacitracin twice daily until healed. Patient may apply hydrogen peroxide soaks to remove any crusting.
Notification Instructions: Patient will be notified of pathology results. However, patient instructed to call the office if not contacted within 2 weeks.
Billing Type: Third-Party Bill

## 2025-03-03 NOTE — PROCEDURE: BIOPSY BY SHAVE METHOD
Detail Level: Detailed
Depth Of Biopsy: dermis
Was A Bandage Applied: Yes
Size Of Lesion In Cm: 0
Biopsy Type: H and E
Biopsy Method: double edge Personna blade
Anesthesia Type: 1% lidocaine without epinephrine and a 1:12 solution of 8.4% sodium bicarbonate
Anesthesia Volume In Cc: 0.5
Hemostasis: Aluminum Chloride
Wound Care: Petrolatum
Dressing: bandage
Destruction After The Procedure: No
Type Of Destruction Used: Curettage
Curettage Text: The wound bed was treated with curettage after the biopsy was performed.
Cryotherapy Text: The wound bed was treated with cryotherapy after the biopsy was performed.
Electrodesiccation Text: The wound bed was treated with electrodesiccation after the biopsy was performed.
Electrodesiccation And Curettage Text: The wound bed was treated with electrodesiccation and curettage after the biopsy was performed.
Silver Nitrate Text: The wound bed was treated with silver nitrate after the biopsy was performed.
Lab: [id number]
Lab Facility: [id number]
Medical Necessity Information: It is in your best interest to select a reason for this procedure from the list below. All of these items fulfill various CMS LCD requirements except the new and changing color options.
Consent: Written consent was obtained and risks were reviewed including but not limited to scarring, infection, bleeding, scabbing, incomplete removal, nerve damage and allergy to anesthesia.
Post-Care Instructions: I reviewed with the patient in detail post-care instructions. Patient is to keep the biopsy site dry overnight, and then apply bacitracin twice daily until healed. Patient may apply hydrogen peroxide soaks to remove any crusting.
Notification Instructions: Patient will be notified of biopsy results. However, patient instructed to call the office if not contacted within 2 weeks.
Billing Type: Third-Party Bill
Information: Selecting Yes will display possible errors in your note based on the variables you have selected. This validation is only offered as a suggestion for you. PLEASE NOTE THAT THE VALIDATION TEXT WILL BE REMOVED WHEN YOU FINALIZE YOUR NOTE. IF YOU WANT TO FAX A PRELIMINARY NOTE YOU WILL NEED TO TOGGLE THIS TO 'NO' IF YOU DO NOT WANT IT IN YOUR FAXED NOTE.

## 2025-03-19 ENCOUNTER — RX ONLY (RX ONLY)
Age: 35
End: 2025-03-19

## 2025-03-19 RX ORDER — CLOBETASOL PROPIONATE 0.5 MG/ML
SOLUTION TOPICAL
Qty: 50 | Refills: 2 | Status: ERX | COMMUNITY
Start: 2025-03-19

## 2025-03-19 RX ORDER — CLINDAMYCIN PHOSPHATE 10 MG/ML
SOLUTION TOPICAL
Qty: 60 | Refills: 5 | Status: ERX

## 2025-03-19 RX ORDER — ADAPALENE AND BENZOYL PEROXIDE .1; 2.5 G/100G; G/100G
GEL TOPICAL
Qty: 45 | Refills: 3 | Status: ERX